# Patient Record
Sex: FEMALE | Race: OTHER | Employment: OTHER | URBAN - METROPOLITAN AREA
[De-identification: names, ages, dates, MRNs, and addresses within clinical notes are randomized per-mention and may not be internally consistent; named-entity substitution may affect disease eponyms.]

---

## 2017-04-30 PROBLEM — N39.490 OVERFLOW INCONTINENCE OF URINE: Status: ACTIVE | Noted: 2017-04-30

## 2017-04-30 PROBLEM — E11.3599: Status: ACTIVE | Noted: 2017-04-30

## 2017-04-30 PROBLEM — I72.0 CAROTID ANEURYSM, RIGHT (HCC): Status: ACTIVE | Noted: 2017-04-30

## 2017-04-30 PROBLEM — I25.10 CORONARY ARTERY DISEASE INVOLVING NATIVE CORONARY ARTERY OF NATIVE HEART WITHOUT ANGINA PECTORIS: Status: ACTIVE | Noted: 2017-04-30

## 2017-05-11 PROBLEM — M17.12 OSTEOARTHROSIS, LOCALIZED, PRIMARY, KNEE, LEFT: Status: ACTIVE | Noted: 2017-05-11

## 2017-05-11 PROBLEM — M17.11 PRIMARY LOCALIZED OSTEOARTHROSIS, LOWER LEG, RIGHT: Status: ACTIVE | Noted: 2017-05-11

## 2017-06-06 PROCEDURE — 87077 CULTURE AEROBIC IDENTIFY: CPT | Performed by: UROLOGY

## 2017-06-06 PROCEDURE — 87186 SC STD MICRODIL/AGAR DIL: CPT | Performed by: UROLOGY

## 2017-06-06 PROCEDURE — 87086 URINE CULTURE/COLONY COUNT: CPT | Performed by: UROLOGY

## 2017-06-07 PROBLEM — N39.46 MIXED URGE AND STRESS INCONTINENCE: Status: ACTIVE | Noted: 2017-06-07

## 2017-08-04 ENCOUNTER — HOSPITAL ENCOUNTER (INPATIENT)
Facility: HOSPITAL | Age: 62
LOS: 3 days | Discharge: HOME | DRG: 101 | End: 2017-08-07
Attending: EMERGENCY MEDICINE | Admitting: HOSPITALIST
Payer: MEDICARE

## 2017-08-04 ENCOUNTER — APPOINTMENT (OUTPATIENT)
Dept: GENERAL RADIOLOGY | Facility: HOSPITAL | Age: 62
DRG: 101 | End: 2017-08-04
Attending: EMERGENCY MEDICINE
Payer: MEDICARE

## 2017-08-04 ENCOUNTER — APPOINTMENT (OUTPATIENT)
Dept: CT IMAGING | Facility: HOSPITAL | Age: 62
DRG: 101 | End: 2017-08-04
Attending: EMERGENCY MEDICINE
Payer: MEDICARE

## 2017-08-04 DIAGNOSIS — R55 SYNCOPE, UNSPECIFIED SYNCOPE TYPE: Primary | ICD-10-CM

## 2017-08-04 LAB
ALBUMIN SERPL-MCNC: 4 G/DL (ref 3.5–4.8)
ALP LIVER SERPL-CCNC: 139 U/L (ref 50–130)
ALT SERPL-CCNC: 20 U/L (ref 14–54)
AST SERPL-CCNC: 19 U/L (ref 15–41)
BASOPHILS # BLD AUTO: 0.02 X10(3) UL (ref 0–0.1)
BASOPHILS NFR BLD AUTO: 0.5 %
BILIRUB SERPL-MCNC: 0.6 MG/DL (ref 0.1–2)
BILIRUB UR QL STRIP.AUTO: NEGATIVE
BUN BLD-MCNC: 15 MG/DL (ref 8–20)
CALCIUM BLD-MCNC: 9.7 MG/DL (ref 8.3–10.3)
CHLORIDE: 105 MMOL/L (ref 101–111)
CLARITY UR REFRACT.AUTO: CLEAR
CO2: 27 MMOL/L (ref 22–32)
CREAT BLD-MCNC: 1.17 MG/DL (ref 0.55–1.02)
EOSINOPHIL # BLD AUTO: 0.03 X10(3) UL (ref 0–0.3)
EOSINOPHIL NFR BLD AUTO: 0.7 %
ERYTHROCYTE [DISTWIDTH] IN BLOOD BY AUTOMATED COUNT: 13.8 % (ref 11.5–16)
GLUCOSE BLD-MCNC: 123 MG/DL (ref 65–99)
GLUCOSE BLD-MCNC: 134 MG/DL (ref 70–99)
GLUCOSE BLD-MCNC: 167 MG/DL (ref 65–99)
GLUCOSE BLD-MCNC: 282 MG/DL (ref 65–99)
GLUCOSE UR STRIP.AUTO-MCNC: NEGATIVE MG/DL
HCT VFR BLD AUTO: 43.3 % (ref 34–50)
HGB BLD-MCNC: 13.9 G/DL (ref 12–16)
IMMATURE GRANULOCYTE COUNT: 0.01 X10(3) UL (ref 0–1)
IMMATURE GRANULOCYTE RATIO %: 0.2 %
KETONES UR STRIP.AUTO-MCNC: NEGATIVE MG/DL
LEUKOCYTE ESTERASE UR QL STRIP.AUTO: NEGATIVE
LYMPHOCYTES # BLD AUTO: 1.72 X10(3) UL (ref 0.9–4)
LYMPHOCYTES NFR BLD AUTO: 39.8 %
M PROTEIN MFR SERPL ELPH: 8.1 G/DL (ref 6.1–8.3)
MCH RBC QN AUTO: 28 PG (ref 27–33.2)
MCHC RBC AUTO-ENTMCNC: 32.1 G/DL (ref 31–37)
MCV RBC AUTO: 87.3 FL (ref 81–100)
MONOCYTES # BLD AUTO: 0.43 X10(3) UL (ref 0.1–0.6)
MONOCYTES NFR BLD AUTO: 10 %
NEUTROPHIL ABS PRELIM: 2.11 X10 (3) UL (ref 1.3–6.7)
NEUTROPHILS # BLD AUTO: 2.11 X10(3) UL (ref 1.3–6.7)
NEUTROPHILS NFR BLD AUTO: 48.8 %
NITRITE UR QL STRIP.AUTO: NEGATIVE
PH UR STRIP.AUTO: 6 [PH] (ref 4.5–8)
PLATELET # BLD AUTO: 283 10(3)UL (ref 150–450)
POTASSIUM SERPL-SCNC: 3.9 MMOL/L (ref 3.6–5.1)
PROT UR STRIP.AUTO-MCNC: NEGATIVE MG/DL
RBC # BLD AUTO: 4.96 X10(6)UL (ref 3.8–5.1)
RBC UR QL AUTO: NEGATIVE
RED CELL DISTRIBUTION WIDTH-SD: 44 FL (ref 35.1–46.3)
SODIUM SERPL-SCNC: 141 MMOL/L (ref 136–144)
SP GR UR STRIP.AUTO: <1.005 (ref 1–1.03)
UROBILINOGEN UR STRIP.AUTO-MCNC: <2 MG/DL
WBC # BLD AUTO: 4.3 X10(3) UL (ref 4–13)

## 2017-08-04 PROCEDURE — 99285 EMERGENCY DEPT VISIT HI MDM: CPT

## 2017-08-04 PROCEDURE — 71010 XR CHEST AP PORTABLE  (CPT=71010): CPT | Performed by: EMERGENCY MEDICINE

## 2017-08-04 PROCEDURE — 85025 COMPLETE CBC W/AUTO DIFF WBC: CPT | Performed by: EMERGENCY MEDICINE

## 2017-08-04 PROCEDURE — 70450 CT HEAD/BRAIN W/O DYE: CPT | Performed by: EMERGENCY MEDICINE

## 2017-08-04 PROCEDURE — 93005 ELECTROCARDIOGRAM TRACING: CPT

## 2017-08-04 PROCEDURE — 82962 GLUCOSE BLOOD TEST: CPT

## 2017-08-04 PROCEDURE — 83036 HEMOGLOBIN GLYCOSYLATED A1C: CPT | Performed by: HOSPITALIST

## 2017-08-04 PROCEDURE — 80053 COMPREHEN METABOLIC PANEL: CPT | Performed by: EMERGENCY MEDICINE

## 2017-08-04 PROCEDURE — 96360 HYDRATION IV INFUSION INIT: CPT

## 2017-08-04 PROCEDURE — 93010 ELECTROCARDIOGRAM REPORT: CPT

## 2017-08-04 PROCEDURE — 96361 HYDRATE IV INFUSION ADD-ON: CPT

## 2017-08-04 PROCEDURE — 81003 URINALYSIS AUTO W/O SCOPE: CPT | Performed by: EMERGENCY MEDICINE

## 2017-08-04 RX ORDER — FLUOXETINE HYDROCHLORIDE 20 MG/1
20 CAPSULE ORAL DAILY
Status: DISCONTINUED | OUTPATIENT
Start: 2017-08-05 | End: 2017-08-04

## 2017-08-04 RX ORDER — ATORVASTATIN CALCIUM 20 MG/1
20 TABLET, FILM COATED ORAL NIGHTLY
Status: DISCONTINUED | OUTPATIENT
Start: 2017-08-04 | End: 2017-08-07

## 2017-08-04 RX ORDER — FLUOXETINE HYDROCHLORIDE 20 MG/1
20 CAPSULE ORAL DAILY
Status: DISCONTINUED | OUTPATIENT
Start: 2017-08-04 | End: 2017-08-07

## 2017-08-04 RX ORDER — ASPIRIN 81 MG/1
81 TABLET, CHEWABLE ORAL DAILY
Status: DISCONTINUED | OUTPATIENT
Start: 2017-08-04 | End: 2017-08-07

## 2017-08-04 RX ORDER — HYDROCHLOROTHIAZIDE 25 MG/1
25 TABLET ORAL DAILY
Status: DISCONTINUED | OUTPATIENT
Start: 2017-08-05 | End: 2017-08-04

## 2017-08-04 RX ORDER — HYDROCHLOROTHIAZIDE 25 MG/1
25 TABLET ORAL DAILY
COMMUNITY
End: 2018-05-18

## 2017-08-04 RX ORDER — FUROSEMIDE 20 MG/1
40 TABLET ORAL DAILY
COMMUNITY
End: 2017-10-25

## 2017-08-04 RX ORDER — LOSARTAN POTASSIUM 100 MG/1
100 TABLET ORAL DAILY
Status: DISCONTINUED | OUTPATIENT
Start: 2017-08-04 | End: 2017-08-04

## 2017-08-04 RX ORDER — METOPROLOL SUCCINATE 100 MG/1
100 TABLET, EXTENDED RELEASE ORAL DAILY
Status: ON HOLD | COMMUNITY
End: 2017-08-06

## 2017-08-04 RX ORDER — HYDROCHLOROTHIAZIDE 25 MG/1
25 TABLET ORAL DAILY
Status: DISCONTINUED | OUTPATIENT
Start: 2017-08-04 | End: 2017-08-07

## 2017-08-04 RX ORDER — MELATONIN
325
Status: DISCONTINUED | OUTPATIENT
Start: 2017-08-05 | End: 2017-08-07

## 2017-08-04 RX ORDER — POTASSIUM CHLORIDE 20 MEQ/1
40 TABLET, EXTENDED RELEASE ORAL DAILY
COMMUNITY
End: 2018-05-18

## 2017-08-04 RX ORDER — AMLODIPINE BESYLATE 5 MG/1
10 TABLET ORAL DAILY
Status: DISCONTINUED | OUTPATIENT
Start: 2017-08-04 | End: 2017-08-07

## 2017-08-04 RX ORDER — OXYBUTYNIN CHLORIDE 10 MG/1
10 TABLET, EXTENDED RELEASE ORAL DAILY
Status: DISCONTINUED | OUTPATIENT
Start: 2017-08-05 | End: 2017-08-04

## 2017-08-04 RX ORDER — IRBESARTAN 300 MG/1
300 TABLET ORAL NIGHTLY
COMMUNITY
End: 2017-11-06

## 2017-08-04 RX ORDER — OXYBUTYNIN CHLORIDE 10 MG/1
10 TABLET, EXTENDED RELEASE ORAL DAILY
Status: DISCONTINUED | OUTPATIENT
Start: 2017-08-04 | End: 2017-08-07

## 2017-08-04 RX ORDER — AMLODIPINE BESYLATE 10 MG/1
10 TABLET ORAL DAILY
COMMUNITY
End: 2017-10-25

## 2017-08-04 RX ORDER — FLUOXETINE HYDROCHLORIDE 20 MG/1
20 CAPSULE ORAL DAILY
COMMUNITY
End: 2017-10-25

## 2017-08-04 RX ORDER — SODIUM CHLORIDE 9 MG/ML
INJECTION, SOLUTION INTRAVENOUS CONTINUOUS
Status: DISCONTINUED | OUTPATIENT
Start: 2017-08-04 | End: 2017-08-05

## 2017-08-04 RX ORDER — AMLODIPINE BESYLATE 5 MG/1
10 TABLET ORAL DAILY
Status: DISCONTINUED | OUTPATIENT
Start: 2017-08-04 | End: 2017-08-04

## 2017-08-04 RX ORDER — MV,CALCIUM,MIN/IRON/FOLIC/VITK 9-200-40
1 TABLET,CHEWABLE ORAL 4 TIMES DAILY
COMMUNITY

## 2017-08-04 RX ORDER — FLUOXETINE HYDROCHLORIDE 20 MG/1
20 CAPSULE ORAL DAILY
Status: DISCONTINUED | OUTPATIENT
Start: 2017-08-04 | End: 2017-08-04

## 2017-08-04 RX ORDER — POTASSIUM CHLORIDE 1.5 G/1.77G
40 POWDER, FOR SOLUTION ORAL DAILY
COMMUNITY
End: 2017-08-04

## 2017-08-04 RX ORDER — MELOXICAM 15 MG/1
15 TABLET ORAL DAILY
COMMUNITY
End: 2017-10-25

## 2017-08-04 RX ORDER — ATORVASTATIN CALCIUM 20 MG/1
20 TABLET, FILM COATED ORAL NIGHTLY
Status: DISCONTINUED | OUTPATIENT
Start: 2017-08-04 | End: 2017-08-04

## 2017-08-04 RX ORDER — ATORVASTATIN CALCIUM 20 MG/1
20 TABLET, FILM COATED ORAL NIGHTLY
COMMUNITY
End: 2018-03-15

## 2017-08-04 RX ORDER — HEPARIN SODIUM 5000 [USP'U]/ML
5000 INJECTION, SOLUTION INTRAVENOUS; SUBCUTANEOUS EVERY 8 HOURS SCHEDULED
Status: DISCONTINUED | OUTPATIENT
Start: 2017-08-04 | End: 2017-08-05

## 2017-08-04 RX ORDER — HYDROCHLOROTHIAZIDE 25 MG/1
25 TABLET ORAL
Status: DISCONTINUED | OUTPATIENT
Start: 2017-08-05 | End: 2017-08-04

## 2017-08-04 RX ORDER — MELATONIN
325
COMMUNITY
End: 2018-01-17

## 2017-08-04 RX ORDER — LOSARTAN POTASSIUM 100 MG/1
100 TABLET ORAL DAILY
Status: DISCONTINUED | OUTPATIENT
Start: 2017-08-04 | End: 2017-08-07

## 2017-08-04 RX ORDER — DEXTROSE MONOHYDRATE 25 G/50ML
50 INJECTION, SOLUTION INTRAVENOUS
Status: DISCONTINUED | OUTPATIENT
Start: 2017-08-04 | End: 2017-08-07

## 2017-08-04 NOTE — ED PROVIDER NOTES
Patient Seen in: [facility] Emergency Department    History   Patient presents with:  Syncope (cardiovascular, neurologic)    Stated Complaint: SYNCOPE    HPI    61-year-old female presents to the emergency department from the [address] blood pressure    • High cholesterol    • Lumbago     tizanidine daily for years--2 pills daily   • Lumbar disc disease     [facility]--back injections   • Migraine     as per neurology-Dr. Ayon--well-controlled on Topomax   • Muscle weakness MOUTH IN THE MORNING WITH BREAKFAST   Pioglitazone HCl 15 MG Oral Tab,  Take 1 tablet (15 mg total) by mouth daily.    Glucose Blood (ONETOUCH ULTRA BLUE) In Vitro Strip,  Test blood sugar 4 times per day Dx S20.1656, non-insulin dependent   ONETOUCH LANCET above.    PSFH elements reviewed from today and agreed except as otherwise stated in HPI.     Physical Exam   ED Triage Vitals [08/04/17 1705]  BP: 150/77  Pulse: 52  Resp: 16  Temp: 97.6 °F (36.4 °C)  Temp src: Temporal  SpO2: 96 %  O2 Device: None (Room a RAINBOW DRAW GOLD   RAINBOW DRAW LAVENDER   RAINBOW DRAW LIGHT GREEN     EKG    Rate, intervals and axes as noted on EKG Report. Rate: 53  Rhythm: Sinus Rhythm  Reading: Right bundle branch block. Abnormal EKG. I agree with EKG report.          Chest x

## 2017-08-05 ENCOUNTER — APPOINTMENT (OUTPATIENT)
Dept: CV DIAGNOSTICS | Facility: HOSPITAL | Age: 62
DRG: 101 | End: 2017-08-05
Attending: INTERNAL MEDICINE
Payer: MEDICARE

## 2017-08-05 LAB
ATRIAL RATE: 53 BPM
BASOPHILS # BLD AUTO: 0.03 X10(3) UL (ref 0–0.1)
BASOPHILS NFR BLD AUTO: 0.5 %
BUN BLD-MCNC: 13 MG/DL (ref 8–20)
CALCIUM BLD-MCNC: 8.8 MG/DL (ref 8.3–10.3)
CHLORIDE: 112 MMOL/L (ref 101–111)
CO2: 26 MMOL/L (ref 22–32)
CREAT BLD-MCNC: 0.87 MG/DL (ref 0.55–1.02)
EOSINOPHIL # BLD AUTO: 0.06 X10(3) UL (ref 0–0.3)
EOSINOPHIL NFR BLD AUTO: 1.1 %
ERYTHROCYTE [DISTWIDTH] IN BLOOD BY AUTOMATED COUNT: 13.9 % (ref 11.5–16)
EST. AVERAGE GLUCOSE BLD GHB EST-MCNC: 154 MG/DL (ref 68–126)
GLUCOSE BLD-MCNC: 113 MG/DL (ref 65–99)
GLUCOSE BLD-MCNC: 137 MG/DL (ref 65–99)
GLUCOSE BLD-MCNC: 137 MG/DL (ref 65–99)
GLUCOSE BLD-MCNC: 208 MG/DL (ref 65–99)
GLUCOSE BLD-MCNC: 95 MG/DL (ref 70–99)
HBA1C MFR BLD HPLC: 7 % (ref ?–5.7)
HCT VFR BLD AUTO: 32.7 % (ref 34–50)
HGB BLD-MCNC: 10.4 G/DL (ref 12–16)
IMMATURE GRANULOCYTE COUNT: 0.01 X10(3) UL (ref 0–1)
IMMATURE GRANULOCYTE RATIO %: 0.2 %
LYMPHOCYTES # BLD AUTO: 2.1 X10(3) UL (ref 0.9–4)
LYMPHOCYTES NFR BLD AUTO: 38.3 %
MCH RBC QN AUTO: 28.3 PG (ref 27–33.2)
MCHC RBC AUTO-ENTMCNC: 31.8 G/DL (ref 31–37)
MCV RBC AUTO: 88.9 FL (ref 81–100)
MONOCYTES # BLD AUTO: 0.57 X10(3) UL (ref 0.1–0.6)
MONOCYTES NFR BLD AUTO: 10.4 %
NEUTROPHIL ABS PRELIM: 2.71 X10 (3) UL (ref 1.3–6.7)
NEUTROPHILS # BLD AUTO: 2.71 X10(3) UL (ref 1.3–6.7)
NEUTROPHILS NFR BLD AUTO: 49.5 %
P AXIS: 55 DEGREES
P-R INTERVAL: 130 MS
PLATELET # BLD AUTO: 214 10(3)UL (ref 150–450)
POTASSIUM SERPL-SCNC: 3.4 MMOL/L (ref 3.6–5.1)
Q-T INTERVAL: 472 MS
QRS DURATION: 138 MS
QTC CALCULATION (BEZET): 442 MS
R AXIS: 72 DEGREES
RBC # BLD AUTO: 3.68 X10(6)UL (ref 3.8–5.1)
RED CELL DISTRIBUTION WIDTH-SD: 45 FL (ref 35.1–46.3)
SODIUM SERPL-SCNC: 145 MMOL/L (ref 136–144)
T AXIS: 7 DEGREES
TROPONIN: <0.046 NG/ML (ref ?–0.05)
VENTRICULAR RATE: 53 BPM
WBC # BLD AUTO: 5.5 X10(3) UL (ref 4–13)

## 2017-08-05 PROCEDURE — 85025 COMPLETE CBC W/AUTO DIFF WBC: CPT | Performed by: HOSPITALIST

## 2017-08-05 PROCEDURE — 80048 BASIC METABOLIC PNL TOTAL CA: CPT | Performed by: HOSPITALIST

## 2017-08-05 PROCEDURE — 95819 EEG AWAKE AND ASLEEP: CPT

## 2017-08-05 PROCEDURE — 84484 ASSAY OF TROPONIN QUANT: CPT | Performed by: INTERNAL MEDICINE

## 2017-08-05 PROCEDURE — 95816 EEG AWAKE AND DROWSY: CPT

## 2017-08-05 PROCEDURE — 82962 GLUCOSE BLOOD TEST: CPT

## 2017-08-05 RX ORDER — POTASSIUM CHLORIDE 20 MEQ/1
40 TABLET, EXTENDED RELEASE ORAL EVERY 4 HOURS
Status: COMPLETED | OUTPATIENT
Start: 2017-08-05 | End: 2017-08-05

## 2017-08-05 RX ORDER — HEPARIN SODIUM 5000 [USP'U]/ML
7500 INJECTION, SOLUTION INTRAVENOUS; SUBCUTANEOUS EVERY 8 HOURS SCHEDULED
Status: DISCONTINUED | OUTPATIENT
Start: 2017-08-05 | End: 2017-08-07

## 2017-08-05 RX ORDER — CALCIUM CARBONATE 200(500)MG
500 TABLET,CHEWABLE ORAL 3 TIMES DAILY PRN
Status: DISCONTINUED | OUTPATIENT
Start: 2017-08-05 | End: 2017-08-07

## 2017-08-05 NOTE — H&P
[facility] Hospitalist History and Physical      CC: Syncope x 2    PCP: Maggie Hsieh MD    History of Present Illness: Patient is a 65year old female with PMH sig for DM, carotid aneursym,  DM, HTN, migraines, MVP and hx of TIA presenting with 2 episodes of sy • High blood pressure    • High cholesterol    • Lumbago     tizanidine daily for years--2 pills daily   • Lumbar disc disease     [facility]--back injections   • Migraine     as per neurology-Dr. Ayon--well-controlled on Topomax   • Muscle we LANCETS Does not apply Misc Test blood sugar 4 times per day Dx: I77.1300, non-insulin dependent Disp: 300 each Rfl: 3   IRBESARTAN 300 MG Oral Tab TAKE 1 TABLET BY MOUTH EVERY DAY Disp: 30 tablet Rfl: 2   METFORMIN HCL 1000 MG Oral Tab TAKE 1 TABLET BY MO Gen: No acute distress  Eyes: Pink conjunctivae, No ptosis, PERRL  Neck: No masses, trachae midline. No thyromegaly  Pulm: Lungs clear bilaterally, normal respiratory effort  CV: Heart with regular rate and rhythm, no murmur. Normal PMI.     GI: Abdome currently she has no focal neurologic symptoms or defects   - Check orthostatics  - BS has been OK, check HA1C- autonomic dysfunction?     HTN  - Continue home meds with exception of BB and lasix    DM  - Hold home meds, SSI     Reported hx of TIA  - Contin

## 2017-08-05 NOTE — CONSULTS
[address]    PATIENT'S NAME: Omari Ahn   ATTENDING PHYSICIAN: [address] [facility] Lita Waldron MD   CONSULTING PHYSICIAN: Francisco Correia M.D.    PATIENT ACCOUNT#:   [de-identified]    LOCATION:  [address] [place] [address]  MEDICAL RECORD #:   [id number]       DATE irbesartan; metformin; oxybutynin; metoprolol; hydrochlorothiazide; Januvia; and 81 mg of aspirin a day. ALLERGIES:  Allergic or intolerant to erythromycin, penicillin, sulfa, Norco.    REVIEW OF SYSTEMS:  The rest of her review of systems is negative.

## 2017-08-05 NOTE — PROGRESS NOTES
08/04/17 2215 08/04/17 2217 08/04/17 2220   Vital Signs   /53 127/65 124/68   BP Location Left arm Left arm Left arm   BP Method Automatic Automatic Automatic   Patient Position Lying Sitting Standing       Orthostatic BP negative

## 2017-08-05 NOTE — PROGRESS NOTES
[facility] Progress Note:  Anticoagulation Weight Dose Adjustment for heparin    Vianne Sensor is a 65year old female who has been prescribed heparin for VTE prophylaxis. Estimated Creatinine Clearance: 63.6 mL/min (based on SCr of 0.87 mg/dL).

## 2017-08-05 NOTE — CONSULTS
[facility] Cardiology Consultation Dipak Auguste MD    The patient was interviewed, examined, the chart was reviewed and the consult was dictated. This is a 65year old female with a chief complaint of syncope ×2. Impression:  1.   Syncope ×2 with urinary in

## 2017-08-05 NOTE — PROGRESS NOTES
AOx4, RA, NSR/SB when sleeping  Up with standby and walker to bathroom  Patient reports no nausea or dizziness  Orthostatics negative  Contacted neurology for consult - no new orders, no neuro checks - will see in the morning  SCDs in place, waiting for la

## 2017-08-05 NOTE — PROGRESS NOTES
[facility] Hospitalist Progress Note                                                                   [address] [facility]  3/8/1955    CC: Syncope    Interval History:  - No syncope or dizziness since change to ROS from my documentation yesterday, except as otherwise noted in the Interval History above.     Assessment/Plan:  Patient is a 65year old female with PMH sig for DM, carotid aneursym, carotid disease, DM, HTN, migraines, MVP and hx of TIA prese

## 2017-08-05 NOTE — CONSULTS
[address]    PATIENT'S NAME: Arleen Bustos   ATTENDING PHYSICIAN: [facility] Darline Olivarez MD   CONSULTING PHYSICIAN: Rell Ramirez M.D.    PATIENT ACCOUNT#:   [de-identified]    LOCATION:  [address] [place] [address]  MEDICAL RECORD #:   [id number]       DATE OF The patient initially felt okay, heart rate was low, blood pressure was normal.  The patient has been checked for orthostatic hypotension and has not been orthostatic. Her beta-blocker was discontinued and her heart rate has normalized.   Patient has histo M.D.  d: 08/05/2017 14:31:13  t: 08/05/2017 15:23:01  [facility] [id number]  [facility]/

## 2017-08-06 ENCOUNTER — APPOINTMENT (OUTPATIENT)
Dept: ULTRASOUND IMAGING | Facility: HOSPITAL | Age: 62
DRG: 101 | End: 2017-08-06
Attending: HOSPITALIST
Payer: MEDICARE

## 2017-08-06 ENCOUNTER — APPOINTMENT (OUTPATIENT)
Dept: CV DIAGNOSTICS | Facility: HOSPITAL | Age: 62
DRG: 101 | End: 2017-08-06
Attending: INTERNAL MEDICINE
Payer: MEDICARE

## 2017-08-06 LAB
GLUCOSE BLD-MCNC: 113 MG/DL (ref 65–99)
GLUCOSE BLD-MCNC: 126 MG/DL (ref 65–99)
GLUCOSE BLD-MCNC: 151 MG/DL (ref 65–99)
GLUCOSE BLD-MCNC: 154 MG/DL (ref 65–99)
PLATELET # BLD AUTO: 209 10(3)UL (ref 150–450)
POTASSIUM SERPL-SCNC: 4 MMOL/L (ref 3.6–5.1)

## 2017-08-06 PROCEDURE — 93306 TTE W/DOPPLER COMPLETE: CPT | Performed by: INTERNAL MEDICINE

## 2017-08-06 PROCEDURE — 93005 ELECTROCARDIOGRAM TRACING: CPT

## 2017-08-06 PROCEDURE — 93010 ELECTROCARDIOGRAM REPORT: CPT | Performed by: INTERNAL MEDICINE

## 2017-08-06 PROCEDURE — 84132 ASSAY OF SERUM POTASSIUM: CPT | Performed by: HOSPITALIST

## 2017-08-06 PROCEDURE — 85049 AUTOMATED PLATELET COUNT: CPT | Performed by: HOSPITALIST

## 2017-08-06 PROCEDURE — 93880 EXTRACRANIAL BILAT STUDY: CPT | Performed by: HOSPITALIST

## 2017-08-06 PROCEDURE — 82962 GLUCOSE BLOOD TEST: CPT

## 2017-08-06 RX ORDER — LEVETIRACETAM 250 MG/1
250 TABLET ORAL 2 TIMES DAILY
Status: DISCONTINUED | OUTPATIENT
Start: 2017-08-06 | End: 2017-08-07

## 2017-08-06 RX ORDER — LEVETIRACETAM 250 MG/1
250 TABLET ORAL 2 TIMES DAILY
Qty: 60 TABLET | Refills: 0 | Status: SHIPPED | OUTPATIENT
Start: 2017-08-06 | End: 2017-09-06

## 2017-08-06 RX ORDER — ASPIRIN 81 MG/1
81 TABLET, CHEWABLE ORAL DAILY
Qty: 30 TABLET | Refills: 0 | Status: SHIPPED | OUTPATIENT
Start: 2017-08-06 | End: 2017-09-05

## 2017-08-06 RX ORDER — KETOROLAC TROMETHAMINE 30 MG/ML
15 INJECTION, SOLUTION INTRAMUSCULAR; INTRAVENOUS EVERY 6 HOURS PRN
Status: DISCONTINUED | OUTPATIENT
Start: 2017-08-06 | End: 2017-08-07

## 2017-08-06 RX ORDER — ACETAMINOPHEN 325 MG/1
650 TABLET ORAL EVERY 6 HOURS PRN
Status: DISCONTINUED | OUTPATIENT
Start: 2017-08-06 | End: 2017-08-07

## 2017-08-06 NOTE — PROCEDURES
[facility]  Kaylah Scott 12  [place], [address] [address]      PATIENT'S NAME: Moncho Martinez   ATTENDING PHYSICIAN: [address] [place] [address]  Eunice Melvin MD   PATIENT ACCOUNT #: [de-identified] LOCATION: [address] [place] [address]   MEDICAL RECORD #: [id number] DATE

## 2017-08-06 NOTE — PLAN OF CARE
Alert and oriented times four  No neurological deficits, no complaints of dizziness/lightheadedness  Up to the bathroom with walker and assist   Tests completed, ECHO pending  Keppra started for suggestive findings on EEG  Patient education completed regar

## 2017-08-06 NOTE — PROGRESS NOTES
[facility] Hospitalist Progress Note                                                                   [address] [facility]  3/8/1955    CC: Syncope    Interval History:  - Feels well, mild HA  - EEG ne 0457   RBC  4.96  3.68*   --    HGB  13.9  10.4*   --    HCT  43.3  32.7*   --    MCV  87.3  88.9   --    MCH  28.0  28.3   --    MCHC  32.1  31.8   --    RDW  13.8  13.9   --    NEPRELIM  2.11  2.71   --    WBC  4.3  5.5   --    PLT  283.0  214.0  209.0 Hospitalist  Pager: [phone]

## 2017-08-06 NOTE — PLAN OF CARE
AOx4, RA  NSR/SB when sleeping  Up with walker and standby  Plan for ultrasound of carotids, echo  Awaiting results of EEG  Patient has no complaints of nausea, dizziness, or pain  VSS, resting comfortably    CARDIOVASCULAR - ADULT    • Maintains optimal c

## 2017-08-06 NOTE — PROGRESS NOTES
[facility] Cardiology Progress Note        Tuyet Drexel Patient Status:  Inpatient    3/8/1955 MRN [id number]   [facility] 7NE-A Attending Brice Pena,*   Hosp Day # 2 PCP Ashanti Dick MD     Subjective and pedal pulses 2+  Neurologic: no focal deficits  Skin: Warm and dry.      Telemetry: SR    EKG:      Echo:      Cardiac Cath:      Labs:  HEM:  Recent Labs   Lab  08/04/17   1708  08/05/17   0519  08/06/17   0457   WBC  4.3  5.5   --    HGB  13.9  10.4* of syncopal attacks. Awaiting neurology's final disposition possible antiseizure medication.   Sandra ALSTON  8/6/2017  11:11 AM

## 2017-08-06 NOTE — PROGRESS NOTES
Tyler Birmingham is a 65year old female. Patient presents with:  Syncope (cardiovascular, neurologic)    HPI:    No events overnight    Pt just completed echo, carotid to be done yet    EEG #:       1. 10-[employer].   2.  Bipolar and feels well otherwise  SKIN: denies any unusual skin lesions or rashes  EYES: no visual complaints or deficits  HEENT: denies nasal congestion, sinus pain or sore throat; hearing loss negative  RESPIRATORY: denies shortness of breath, wheezing or cough   CA

## 2017-08-07 VITALS
OXYGEN SATURATION: 99 % | HEIGHT: 66 IN | DIASTOLIC BLOOD PRESSURE: 79 MMHG | WEIGHT: 272.06 LBS | RESPIRATION RATE: 20 BRPM | HEART RATE: 66 BPM | TEMPERATURE: 98 F | SYSTOLIC BLOOD PRESSURE: 92 MMHG | BODY MASS INDEX: 43.72 KG/M2

## 2017-08-07 LAB
ATRIAL RATE: 54 BPM
GLUCOSE BLD-MCNC: 124 MG/DL (ref 65–99)
GLUCOSE BLD-MCNC: 143 MG/DL (ref 65–99)
P AXIS: 49 DEGREES
P-R INTERVAL: 130 MS
Q-T INTERVAL: 450 MS
QRS DURATION: 134 MS
QTC CALCULATION (BEZET): 426 MS
R AXIS: 52 DEGREES
T AXIS: 0 DEGREES
VENTRICULAR RATE: 54 BPM

## 2017-08-07 PROCEDURE — 82962 GLUCOSE BLOOD TEST: CPT

## 2017-08-07 NOTE — PLAN OF CARE
NURSING DISCHARGE NOTE    Discharged Home via Wheelchair. Accompanied by Support staff  Belongings Taken by patient/family.       Discharge paperwork reviewed with patient including new medications and follow up appointments

## 2017-08-07 NOTE — PLAN OF CARE
Assumed care at 1900. AOx4. C/o slight headache. Pain med offered but pt wanted to wait if it will go away. Ambulates to bathroom without any difficulty. Vitals stable. Possible d/c today. Plan of care discussed with pt. Call light within reach.

## 2017-08-07 NOTE — DISCHARGE SUMMARY
General Medicine Discharge Summary     Patient ID:  Tuyet Huntington  65year old  3/8/1955    Admit date: 8/4/2017    Discharge date and time:  8/7/17    Attending Physician: Joselin Hurt arrhythmias on tele  - ECHO looks OK     HTN  - Continue home meds on DC with exception of BB     DM  - Hold home meds, SSI- return to home regimen on DC  - HA1C is 7     Reported hx of TIA  - Continue ASA daily  - No focal deficits on exam     Hx of urina daily. Oxybutynin Chloride ER 10 MG Oral Tablet 24 Hr  Take 1 tablet (10 mg total) by mouth daily. SITagliptin Phosphate (JANUVIA) 100 MG Oral Tab  Take 1 tablet (100 mg total) by mouth daily.     silver sulfADIAZINE (SILVADENE) 1 % External Cream  Ap

## 2017-08-07 NOTE — PROGRESS NOTES
[facility] Cardiology Progress Note - Sharon Johnston Patient Status:  Inpatient    3/8/1955 MRN [id number]   [facility] 7NE-A Attending Lacho Mata,*   Hosp Day # 3 PCP Safia Schmidt MD     Subjective:   The normal. No murmur, pericardial rub, S3, thrill, heave or extra cardiac sounds. Lungs: Clear without wheezes, rales, rhonchi or dullness. Normal excursions and effort. Abdomen: Soft, non-tender. No organosplenomegally, mass or rebound, BS-present.   Extre Min PRN   Or      Glucose-Vitamin C (DEX-4) 4-0.006 g chewable tab 8 tablet 8 tablet Oral Q15 Min PRN   Insulin Aspart Pen (NOVOLOG) 100 UNIT/ML flexpen 1-5 Units 1-5 Units Subcutaneous TID CC and HS   AmLODIPine Besylate (NORVASC) tab 10 mg 10 mg Oral Virl Dom

## 2017-08-15 PROBLEM — G40.909 SEIZURE DISORDER (HCC): Status: ACTIVE | Noted: 2017-08-15

## 2017-10-12 PROBLEM — M17.11 PRIMARY LOCALIZED OSTEOARTHROSIS, LOWER LEG, RIGHT: Status: RESOLVED | Noted: 2017-05-11 | Resolved: 2017-10-12

## 2017-10-12 PROBLEM — M17.12 OSTEOARTHROSIS, LOCALIZED, PRIMARY, KNEE, LEFT: Status: RESOLVED | Noted: 2017-05-11 | Resolved: 2017-10-12

## 2017-10-12 PROBLEM — N39.490 OVERFLOW INCONTINENCE OF URINE: Status: RESOLVED | Noted: 2017-04-30 | Resolved: 2017-10-12

## 2018-01-17 PROCEDURE — 36415 COLL VENOUS BLD VENIPUNCTURE: CPT | Performed by: INTERNAL MEDICINE

## 2018-01-17 PROCEDURE — 82570 ASSAY OF URINE CREATININE: CPT | Performed by: INTERNAL MEDICINE

## 2018-01-17 PROCEDURE — 82043 UR ALBUMIN QUANTITATIVE: CPT | Performed by: INTERNAL MEDICINE

## 2018-05-16 PROBLEM — E11.3592: Status: ACTIVE | Noted: 2018-05-16

## 2018-07-17 PROBLEM — M17.0 PRIMARY OSTEOARTHRITIS OF BOTH KNEES: Status: ACTIVE | Noted: 2018-07-17

## 2018-09-04 PROCEDURE — 80074 ACUTE HEPATITIS PANEL: CPT | Performed by: INTERNAL MEDICINE

## 2018-09-04 PROCEDURE — 84075 ASSAY ALKALINE PHOSPHATASE: CPT | Performed by: INTERNAL MEDICINE

## 2018-09-04 PROCEDURE — 83516 IMMUNOASSAY NONANTIBODY: CPT | Performed by: INTERNAL MEDICINE

## 2018-09-04 PROCEDURE — 84080 ASSAY ALKALINE PHOSPHATASES: CPT | Performed by: INTERNAL MEDICINE

## 2018-09-04 PROCEDURE — 36415 COLL VENOUS BLD VENIPUNCTURE: CPT | Performed by: INTERNAL MEDICINE

## 2018-09-04 PROCEDURE — 82390 ASSAY OF CERULOPLASMIN: CPT | Performed by: INTERNAL MEDICINE

## 2018-11-02 ENCOUNTER — APPOINTMENT (OUTPATIENT)
Dept: GENERAL RADIOLOGY | Facility: HOSPITAL | Age: 63
End: 2018-11-02
Attending: EMERGENCY MEDICINE
Payer: MEDICARE

## 2018-11-02 ENCOUNTER — HOSPITAL ENCOUNTER (OUTPATIENT)
Facility: HOSPITAL | Age: 63
Setting detail: OBSERVATION
Discharge: HOME | End: 2018-11-03
Attending: EMERGENCY MEDICINE | Admitting: HOSPITALIST
Payer: MEDICARE

## 2018-11-02 DIAGNOSIS — R07.9 ACUTE CHEST PAIN: Primary | ICD-10-CM

## 2018-11-02 DIAGNOSIS — L02.31 CUTANEOUS ABSCESS OF BUTTOCK: ICD-10-CM

## 2018-11-02 PROCEDURE — 93010 ELECTROCARDIOGRAM REPORT: CPT

## 2018-11-02 PROCEDURE — 85730 THROMBOPLASTIN TIME PARTIAL: CPT | Performed by: EMERGENCY MEDICINE

## 2018-11-02 PROCEDURE — 93005 ELECTROCARDIOGRAM TRACING: CPT

## 2018-11-02 PROCEDURE — 99285 EMERGENCY DEPT VISIT HI MDM: CPT

## 2018-11-02 PROCEDURE — 85610 PROTHROMBIN TIME: CPT | Performed by: EMERGENCY MEDICINE

## 2018-11-02 PROCEDURE — 80061 LIPID PANEL: CPT | Performed by: EMERGENCY MEDICINE

## 2018-11-02 PROCEDURE — 71045 X-RAY EXAM CHEST 1 VIEW: CPT | Performed by: EMERGENCY MEDICINE

## 2018-11-02 PROCEDURE — 84484 ASSAY OF TROPONIN QUANT: CPT | Performed by: EMERGENCY MEDICINE

## 2018-11-02 PROCEDURE — 80053 COMPREHEN METABOLIC PANEL: CPT | Performed by: EMERGENCY MEDICINE

## 2018-11-02 PROCEDURE — 36415 COLL VENOUS BLD VENIPUNCTURE: CPT

## 2018-11-02 RX ORDER — AMLODIPINE BESYLATE 5 MG/1
10 TABLET ORAL
Status: DISCONTINUED | OUTPATIENT
Start: 2018-11-03 | End: 2018-11-03

## 2018-11-02 RX ORDER — ASPIRIN 81 MG/1
81 TABLET, CHEWABLE ORAL DAILY
Status: DISCONTINUED | OUTPATIENT
Start: 2018-11-03 | End: 2018-11-03

## 2018-11-02 RX ORDER — POLYETHYLENE GLYCOL 3350 17 G/17G
17 POWDER, FOR SOLUTION ORAL DAILY PRN
Status: DISCONTINUED | OUTPATIENT
Start: 2018-11-02 | End: 2018-11-03

## 2018-11-02 RX ORDER — DEXTROSE MONOHYDRATE 25 G/50ML
50 INJECTION, SOLUTION INTRAVENOUS
Status: DISCONTINUED | OUTPATIENT
Start: 2018-11-02 | End: 2018-11-03

## 2018-11-02 RX ORDER — OXYBUTYNIN CHLORIDE 10 MG/1
10 TABLET, EXTENDED RELEASE ORAL DAILY
Status: DISCONTINUED | OUTPATIENT
Start: 2018-11-03 | End: 2018-11-03

## 2018-11-02 RX ORDER — ONDANSETRON 2 MG/ML
4 INJECTION INTRAMUSCULAR; INTRAVENOUS EVERY 6 HOURS PRN
Status: DISCONTINUED | OUTPATIENT
Start: 2018-11-02 | End: 2018-11-03

## 2018-11-02 RX ORDER — SODIUM PHOSPHATE, DIBASIC AND SODIUM PHOSPHATE, MONOBASIC 7; 19 G/133ML; G/133ML
1 ENEMA RECTAL ONCE AS NEEDED
Status: DISCONTINUED | OUTPATIENT
Start: 2018-11-02 | End: 2018-11-03

## 2018-11-02 RX ORDER — FUROSEMIDE 40 MG/1
40 TABLET ORAL DAILY
Status: DISCONTINUED | OUTPATIENT
Start: 2018-11-03 | End: 2018-11-03

## 2018-11-02 RX ORDER — HYDROCHLOROTHIAZIDE 25 MG/1
25 TABLET ORAL
Status: DISCONTINUED | OUTPATIENT
Start: 2018-11-03 | End: 2018-11-03

## 2018-11-02 RX ORDER — METOCLOPRAMIDE HYDROCHLORIDE 5 MG/ML
10 INJECTION INTRAMUSCULAR; INTRAVENOUS EVERY 8 HOURS PRN
Status: DISCONTINUED | OUTPATIENT
Start: 2018-11-02 | End: 2018-11-03

## 2018-11-02 RX ORDER — FLUOXETINE HYDROCHLORIDE 20 MG/1
20 CAPSULE ORAL DAILY
Status: DISCONTINUED | OUTPATIENT
Start: 2018-11-03 | End: 2018-11-03

## 2018-11-02 RX ORDER — ONDANSETRON 2 MG/ML
4 INJECTION INTRAMUSCULAR; INTRAVENOUS EVERY 4 HOURS PRN
Status: DISCONTINUED | OUTPATIENT
Start: 2018-11-02 | End: 2018-11-03

## 2018-11-02 RX ORDER — BISACODYL 10 MG
10 SUPPOSITORY, RECTAL RECTAL
Status: DISCONTINUED | OUTPATIENT
Start: 2018-11-02 | End: 2018-11-03

## 2018-11-02 RX ORDER — LEVETIRACETAM 250 MG/1
250 TABLET ORAL 3 TIMES DAILY
Status: DISCONTINUED | OUTPATIENT
Start: 2018-11-03 | End: 2018-11-03

## 2018-11-02 RX ORDER — LOSARTAN POTASSIUM 100 MG/1
100 TABLET ORAL DAILY
Status: DISCONTINUED | OUTPATIENT
Start: 2018-11-03 | End: 2018-11-03

## 2018-11-02 RX ORDER — DOCUSATE SODIUM 100 MG/1
100 CAPSULE, LIQUID FILLED ORAL 2 TIMES DAILY
Status: DISCONTINUED | OUTPATIENT
Start: 2018-11-03 | End: 2018-11-03

## 2018-11-02 RX ORDER — MV,CALCIUM,MIN/IRON/FOLIC/VITK 9-200-40
1 TABLET,CHEWABLE ORAL 4 TIMES DAILY
Status: DISCONTINUED | OUTPATIENT
Start: 2018-11-03 | End: 2018-11-03

## 2018-11-02 RX ORDER — ATORVASTATIN CALCIUM 20 MG/1
20 TABLET, FILM COATED ORAL NIGHTLY
Status: DISCONTINUED | OUTPATIENT
Start: 2018-11-03 | End: 2018-11-03

## 2018-11-02 NOTE — ED INITIAL ASSESSMENT (HPI)
Arrives stating has been sick with vomiting and diarrhea for the past two weeks which stopped on Sunday. In the process, developed a sore to the left inner buttock. Hx of diabetes, has been unable to take meds d/t being ill.   Also reports some chest pain

## 2018-11-03 VITALS
OXYGEN SATURATION: 98 % | HEIGHT: 66.5 IN | RESPIRATION RATE: 18 BRPM | TEMPERATURE: 99 F | DIASTOLIC BLOOD PRESSURE: 64 MMHG | WEIGHT: 258.63 LBS | HEART RATE: 82 BPM | BODY MASS INDEX: 41.07 KG/M2 | SYSTOLIC BLOOD PRESSURE: 112 MMHG

## 2018-11-03 PROCEDURE — 85025 COMPLETE CBC W/AUTO DIFF WBC: CPT | Performed by: HOSPITALIST

## 2018-11-03 PROCEDURE — 83036 HEMOGLOBIN GLYCOSYLATED A1C: CPT | Performed by: HOSPITALIST

## 2018-11-03 PROCEDURE — 84484 ASSAY OF TROPONIN QUANT: CPT | Performed by: HOSPITALIST

## 2018-11-03 PROCEDURE — 82962 GLUCOSE BLOOD TEST: CPT

## 2018-11-03 PROCEDURE — 80048 BASIC METABOLIC PNL TOTAL CA: CPT | Performed by: HOSPITALIST

## 2018-11-03 RX ORDER — CLINDAMYCIN HYDROCHLORIDE 300 MG/1
300 CAPSULE ORAL EVERY 8 HOURS SCHEDULED
Qty: 21 CAPSULE | Refills: 0 | Status: SHIPPED | OUTPATIENT
Start: 2018-11-03 | End: 2018-11-10

## 2018-11-03 RX ORDER — GARLIC EXTRACT 500 MG
1 CAPSULE ORAL DAILY
Status: DISCONTINUED | OUTPATIENT
Start: 2018-11-03 | End: 2018-11-03

## 2018-11-03 RX ORDER — GARLIC EXTRACT 500 MG
1 CAPSULE ORAL DAILY
Qty: 30 CAPSULE | Refills: 0 | Status: SHIPPED | OUTPATIENT
Start: 2018-11-03 | End: 2019-02-08

## 2018-11-03 RX ORDER — CLINDAMYCIN HYDROCHLORIDE 150 MG/1
300 CAPSULE ORAL EVERY 8 HOURS SCHEDULED
Status: DISCONTINUED | OUTPATIENT
Start: 2018-11-03 | End: 2018-11-03

## 2018-11-03 NOTE — PLAN OF CARE
Discharge instructions reviewed with patient and daughter. Home medications and follow up appointments discussed  Antibiotic   From pharmacy discussed.   Verbalized understanding of information given

## 2018-11-03 NOTE — H&P
[facility] Hospitalist H&P       CC: chest pain    PCP: Emily Read MD    History of Present Illness: Pt is a 60 yo with mmp including but not limited to HTN/HL, DMII with retinopathy, cerebrovascular dz with hx TIA who is admitted for cp.   Of [facility], [facility]/15/2017   • Severe nonproliferative diabetic retinopathy     one eye with nonproliferative   • Stress fracture     Right foot--10/14'   • STROKE     tia x3 - short term memory loss,confusion,r sided weakness   • Type II or unspecified type Disp: 90 tablet Rfl: 1   KLOR-CON M20 20 MEQ Oral Tab CR TAKE 2 TABLETS BY MOUTH EVERY DAY Disp: 186 tablet Rfl: 1   FLUOXETINE HCL 20 MG Oral Cap TAKE 1 CAPSULE (20 MG TOTAL) BY MOUTH ONCE DAILY.  Disp: 90 capsule Rfl: 0   FUROSEMIDE 20 MG Oral Tab TAKE 2 and nose within normal limits, hearing intact       Neck: Supple, symmetrical   Lungs:   Clear to auscultation bilaterally.  Normal effort   Chest wall:   +reproducible tenderness to L upper chest.   Heart:  Regular rate and rhythm, S1, S2 normal,no LE yasmeen issues, monitor    **PPx-scds    If ok with cards, ok to d/c from IM perspective- f/u with PCP in 1 week      Outpatient records or previous hospital records reviewed. Further recommendations pending patient's clinical course.   [facility] hospitalist to [facility]

## 2018-11-03 NOTE — CONSULTS
[facility]    PATIENT'S NAME: Jonasmelissa Orellanaa   ATTENDING PHYSICIAN: Kavita Aden. Yessi Jackson MD   CONSULTING PHYSICIAN: Pramod Ahumada M.D.    PATIENT ACCOUNT#:   [de-identified]    LOCATION:  [address] [place] [address]  MEDICAL RECORD #:   [id number]       DATE OF BIRTH chronic. CARDIAC RISK FACTORS:  She has never smoked. She has hypertension, hyperlipidemia, and diabetes. MEDICATIONS:  On admission, irbesartan, aspirin, K-Isatu, Prozac, Lasix, metformin, atorvastatin, amlodipine, meloxicam, hydrochlorothiazide. evaluation. 2.   Continue atorvastatin for lipids. 3.   Blood pressure is controlled. No change in therapy. 4.   Continue on telemetry.     Dictated By Mamadou Aburto M.D.  d: 11/03/2018 07:56:01  t: 11/03/2018 08:58:53  Job [id number]  MON

## 2018-11-03 NOTE — PLAN OF CARE
Problem: CARDIOVASCULAR - ADULT  Goal: Maintains optimal cardiac output and hemodynamic stability  INTERVENTIONS:  - Monitor vital signs, rhythm, and trends  - Monitor for bleeding, hypotension and signs of decreased cardiac output  - Evaluate effectivenes - ADULT  Goal: Incision(s), wounds(s) or drain site(s) healing without S/S of infection  INTERVENTIONS:  - Assess and document risk factors for pressure ulcer development  - Assess and document skin integrity  - Assess and document dressing/incision, wound

## 2018-11-03 NOTE — ED PROVIDER NOTES
Patient Seen in: [facility] Emergency Department    History   Patient presents with:  Abdomen/Flank Pain (GI/)    Stated Complaint: abd pain    HPI    Patient is a pleasant 45-year-old female, with multiple past medical problems, presenting for ev injections   • Migraine     as per neurology-Dr. Ayon--well-controlled on Topomax   • Muscle weakness     r sided weakness   • MVP (mitral valve prolapse)     MVP   • Pneumonia, organism unspecified(486)    • Seizure disorder ([facility] 75.) 8/15/2017   • Severe no Current:/79   Pulse 68   Temp 97.7 °F (36.5 °C) (Temporal)   Resp 20   Ht 168.9 cm (5' 6.5\")   Wt 122.5 kg   SpO2 99%   BMI 42.93 kg/m²         Physical Exam    Vital signs noted.    GENERAL: Patient is awake and alert, resting comfortably on t bundle branch block. Nonspecific ST segment changes. Sinus rhythm at 75 bpm.     Xr Chest Ap Portable  (cpt=71045)    Result Date: 11/2/2018  PROCEDURE:  XR CHEST AP PORTABLE  (CPT=71045)  TECHNIQUE:  AP chest radiograph was obtained.   COMPARISON:  STACY the patient's emergency room stay was without incident.       Admission disposition: 11/2/2018  9:12 PM            Disposition and Plan     Clinical Impression:  Acute chest pain  (primary encounter diagnosis)  Cutaneous abscess of buttock    Disposition:

## 2018-11-03 NOTE — PLAN OF CARE
Problem: CARDIOVASCULAR - ADULT  Goal: Maintains optimal cardiac output and hemodynamic stability  INTERVENTIONS:  - Monitor vital signs, rhythm, and trends  - Monitor for bleeding, hypotension and signs of decreased cardiac output  - Evaluate effectivenes ordered  - Monitor response to electrolyte replacements, including rhythm and repeat lab results as appropriate  - Fluid restriction as ordered  - Instruct patient on fluid and nutrition restrictions as appropriate  Outcome: Progressing  stable    Problem:

## 2019-02-11 PROBLEM — R07.9 ACUTE CHEST PAIN: Status: RESOLVED | Noted: 2018-11-02 | Resolved: 2019-02-11

## 2019-02-11 PROBLEM — R55 SYNCOPE: Status: RESOLVED | Noted: 2017-08-04 | Resolved: 2019-02-11

## 2019-02-11 PROBLEM — L02.31 CUTANEOUS ABSCESS OF BUTTOCK: Status: RESOLVED | Noted: 2018-11-02 | Resolved: 2019-02-11

## 2019-02-11 PROCEDURE — 82570 ASSAY OF URINE CREATININE: CPT | Performed by: INTERNAL MEDICINE

## 2019-02-11 PROCEDURE — 82043 UR ALBUMIN QUANTITATIVE: CPT | Performed by: INTERNAL MEDICINE

## 2021-08-18 PROBLEM — E66.01 MORBID OBESITY WITH BMI OF 40.0-44.9, ADULT (HCC): Status: ACTIVE | Noted: 2021-08-18

## 2021-09-23 ENCOUNTER — OFFICE VISIT (OUTPATIENT)
Dept: SURGERY | Facility: CLINIC | Age: 66
End: 2021-09-23
Payer: MEDICARE

## 2021-09-23 VITALS
SYSTOLIC BLOOD PRESSURE: 114 MMHG | BODY MASS INDEX: 39.49 KG/M2 | DIASTOLIC BLOOD PRESSURE: 68 MMHG | WEIGHT: 237 LBS | HEIGHT: 65 IN

## 2021-09-23 DIAGNOSIS — I67.1 ANEURYSM OF CAVERNOUS PORTION OF RIGHT INTERNAL CAROTID ARTERY: Primary | ICD-10-CM

## 2021-09-23 PROCEDURE — 99204 OFFICE O/P NEW MOD 45 MIN: CPT

## 2021-09-23 NOTE — PROGRESS NOTES
Patient is here for consult referred by Dr Katie Mary. She has hx of carotid aneurysm. She states she had a CT scan and there was a \"blockage\". She fell on 8-8 and does not recall what happened.      Barthel-100  MRS-1    Review of Systems:    Angie Renner

## 2021-09-23 NOTE — PROGRESS NOTES
[facility]  Interventional Neuroradiology Clinic Note      Dalia Perdomo MD  Primary Care    Tamy Wheatley MD  Neurology      Date of Service: 9/23/2021     Dear Itzel Condon,     We had the pleasure of seeing Amy Muñoz in the Interventional MS--tried Lumbar puncture, but unsuccessful--so will have repeat MRI--Dr. Lorenzo Felty recommends another MRI--Brain in 6 months   • Anemia     Fe-Def---seeing GI---scheduled for EGD/Colonoscopy 5/14'   • Carotid aneurysm, right (HCC)     2mm R-carotid aneurysm-- - REFERRAL  6/10/14 - JUSTICE Charles    RYGB     Social History:   Retired/disabled   No smoking/ETOH/drugs    Family History:  Problem Relation Age of Onset   • Diabetes Father    • Diabetes Mother    No family history of intracranial/aortic aneurysms, connective HIVES  Penicillins             HIVES  Sulfa Antibiotics       HIVES    Physical Exam:  /68   Ht 65\"   Wt 237 lb (107.5 kg)   BMI 39.44 kg/m²   GENERAL:  The patient was pleasant and cooperative throughout the examination.     HEENT/NEURO:  The patien right internal carotid artery due to calcified   atherosclerotic disease. 3.  Minor calcification in the bilateral carotid bulbs without hemodynamically significant stenosis   within the neck arterial vasculature.    4.  Redemonstrated ill-defined 1.5 cm ISUIA data.  Large cavernous aneurysms may present with mass effect/cranial neuropathy, thromboembolic complications/stroke, sphenoid erosion with life threatening epistaxis, rupture into the cavernous sinus resulting in a carotid-cavernous sinus fistula, o progression/rupture. Additionally, we counseled the patient on lifestyle modification and though is a nonsmoker, the patient's BMI is quite elevated and could benefit from dietary modification with low calorie/low saturated fat diet and regular exercise.

## 2023-01-22 NOTE — ED INITIAL ASSESSMENT (HPI)
Pt states she had a couple of syncope at 0100 yesterday and 0600 today. Pt states she has pain on her right shoulder after she hit it on the door. Pt came from an immediate care for further evaluation. Got dizzy while at the immediate care this afternoon. show

## 2023-06-14 ENCOUNTER — HOSPITAL ENCOUNTER (INPATIENT)
Facility: HOSPITAL | Age: 68
LOS: 1 days | Discharge: HOME HEALTH | End: 2023-06-16
Attending: STUDENT IN AN ORGANIZED HEALTH CARE EDUCATION/TRAINING PROGRAM | Admitting: STUDENT IN AN ORGANIZED HEALTH CARE EDUCATION/TRAINING PROGRAM
Payer: MEDICARE

## 2023-06-14 ENCOUNTER — HOSPITAL ENCOUNTER (OUTPATIENT)
Facility: HOSPITAL | Age: 68
Setting detail: OBSERVATION
Discharge: HOME HEALTH | End: 2023-06-16
Attending: STUDENT IN AN ORGANIZED HEALTH CARE EDUCATION/TRAINING PROGRAM | Admitting: STUDENT IN AN ORGANIZED HEALTH CARE EDUCATION/TRAINING PROGRAM
Payer: MEDICARE

## 2023-06-14 DIAGNOSIS — R53.1 WEAKNESS GENERALIZED: Primary | ICD-10-CM

## 2023-06-14 DIAGNOSIS — R26.2 UNABLE TO AMBULATE: ICD-10-CM

## 2023-06-14 DIAGNOSIS — D64.9 ANEMIA, UNSPECIFIED TYPE: ICD-10-CM

## 2023-06-14 LAB
BASOPHILS # BLD AUTO: 0.03 X10(3) UL (ref 0–0.2)
BASOPHILS NFR BLD AUTO: 0.5 %
EOSINOPHIL # BLD AUTO: 0.11 X10(3) UL (ref 0–0.7)
EOSINOPHIL NFR BLD AUTO: 1.8 %
ERYTHROCYTE [DISTWIDTH] IN BLOOD BY AUTOMATED COUNT: 13.8 %
HCT VFR BLD AUTO: 35.2 %
HGB BLD-MCNC: 11.3 G/DL
IMM GRANULOCYTES # BLD AUTO: 0.01 X10(3) UL (ref 0–1)
IMM GRANULOCYTES NFR BLD: 0.2 %
LYMPHOCYTES # BLD AUTO: 1.55 X10(3) UL (ref 1–4)
LYMPHOCYTES NFR BLD AUTO: 25.2 %
MCH RBC QN AUTO: 26.3 PG (ref 26–34)
MCHC RBC AUTO-ENTMCNC: 32.1 G/DL (ref 31–37)
MCV RBC AUTO: 82.1 FL
MONOCYTES # BLD AUTO: 0.6 X10(3) UL (ref 0.1–1)
MONOCYTES NFR BLD AUTO: 9.7 %
NEUTROPHILS # BLD AUTO: 3.86 X10 (3) UL (ref 1.5–7.7)
NEUTROPHILS # BLD AUTO: 3.86 X10(3) UL (ref 1.5–7.7)
NEUTROPHILS NFR BLD AUTO: 62.6 %
PLATELET # BLD AUTO: 320 10(3)UL (ref 150–450)
RBC # BLD AUTO: 4.29 X10(6)UL
WBC # BLD AUTO: 6.2 X10(3) UL (ref 4–11)

## 2023-06-14 RX ORDER — GABAPENTIN 300 MG/1
300 CAPSULE ORAL 3 TIMES DAILY
COMMUNITY

## 2023-06-14 RX ORDER — ROSUVASTATIN CALCIUM 5 MG/1
5 TABLET, COATED ORAL NIGHTLY
COMMUNITY

## 2023-06-15 ENCOUNTER — APPOINTMENT (OUTPATIENT)
Dept: ULTRASOUND IMAGING | Facility: HOSPITAL | Age: 68
End: 2023-06-15
Attending: STUDENT IN AN ORGANIZED HEALTH CARE EDUCATION/TRAINING PROGRAM
Payer: MEDICARE

## 2023-06-15 ENCOUNTER — APPOINTMENT (OUTPATIENT)
Dept: MRI IMAGING | Facility: HOSPITAL | Age: 68
End: 2023-06-15
Attending: Other
Payer: MEDICARE

## 2023-06-15 ENCOUNTER — APPOINTMENT (OUTPATIENT)
Dept: GENERAL RADIOLOGY | Facility: HOSPITAL | Age: 68
End: 2023-06-15
Attending: STUDENT IN AN ORGANIZED HEALTH CARE EDUCATION/TRAINING PROGRAM
Payer: MEDICARE

## 2023-06-15 PROBLEM — D64.9 ANEMIA: Status: ACTIVE | Noted: 2023-06-15

## 2023-06-15 PROBLEM — D64.9 ANEMIA, UNSPECIFIED TYPE: Status: ACTIVE | Noted: 2023-06-15

## 2023-06-15 PROBLEM — R53.1 WEAKNESS GENERALIZED: Status: ACTIVE | Noted: 2023-06-15

## 2023-06-15 PROBLEM — R26.2 UNABLE TO AMBULATE: Status: ACTIVE | Noted: 2023-06-15

## 2023-06-15 LAB
ALBUMIN SERPL-MCNC: 2.9 G/DL (ref 3.4–5)
ALBUMIN/GLOB SERPL: 0.6 {RATIO} (ref 1–2)
ALP LIVER SERPL-CCNC: 124 U/L
ALT SERPL-CCNC: 12 U/L
ANION GAP SERPL CALC-SCNC: 6 MMOL/L (ref 0–18)
ANION GAP SERPL CALC-SCNC: 7 MMOL/L (ref 0–18)
AST SERPL-CCNC: 12 U/L (ref 15–37)
ATRIAL RATE: 76 BPM
BASOPHILS # BLD AUTO: 0.03 X10(3) UL (ref 0–0.2)
BASOPHILS NFR BLD AUTO: 0.5 %
BILIRUB SERPL-MCNC: 0.4 MG/DL (ref 0.1–2)
BILIRUB UR QL STRIP.AUTO: NEGATIVE
BUN BLD-MCNC: 13 MG/DL (ref 7–18)
BUN BLD-MCNC: 9 MG/DL (ref 7–18)
CALCIUM BLD-MCNC: 8.8 MG/DL (ref 8.5–10.1)
CALCIUM BLD-MCNC: 9.7 MG/DL (ref 8.5–10.1)
CHLORIDE SERPL-SCNC: 108 MMOL/L (ref 98–112)
CHLORIDE SERPL-SCNC: 112 MMOL/L (ref 98–112)
CO2 SERPL-SCNC: 23 MMOL/L (ref 21–32)
CO2 SERPL-SCNC: 24 MMOL/L (ref 21–32)
COLOR UR AUTO: YELLOW
CREAT BLD-MCNC: 0.58 MG/DL
CREAT BLD-MCNC: 0.78 MG/DL
EOSINOPHIL # BLD AUTO: 0.15 X10(3) UL (ref 0–0.7)
EOSINOPHIL NFR BLD AUTO: 2.7 %
ERYTHROCYTE [DISTWIDTH] IN BLOOD BY AUTOMATED COUNT: 13.6 %
GFR SERPLBLD BASED ON 1.73 SQ M-ARVRAT: 83 ML/MIN/1.73M2 (ref 60–?)
GFR SERPLBLD BASED ON 1.73 SQ M-ARVRAT: 99 ML/MIN/1.73M2 (ref 60–?)
GLOBULIN PLAS-MCNC: 5.2 G/DL (ref 2.8–4.4)
GLUCOSE BLD-MCNC: 181 MG/DL (ref 70–99)
GLUCOSE BLD-MCNC: 193 MG/DL (ref 70–99)
GLUCOSE BLD-MCNC: 206 MG/DL (ref 70–99)
GLUCOSE BLD-MCNC: 212 MG/DL (ref 70–99)
GLUCOSE BLD-MCNC: 221 MG/DL (ref 70–99)
GLUCOSE BLD-MCNC: 252 MG/DL (ref 70–99)
GLUCOSE BLD-MCNC: 275 MG/DL (ref 70–99)
GLUCOSE UR STRIP.AUTO-MCNC: 50 MG/DL
HCT VFR BLD AUTO: 32.2 %
HGB BLD-MCNC: 10.3 G/DL
IMM GRANULOCYTES # BLD AUTO: 0.01 X10(3) UL (ref 0–1)
IMM GRANULOCYTES NFR BLD: 0.2 %
LEUKOCYTE ESTERASE UR QL STRIP.AUTO: NEGATIVE
LYMPHOCYTES # BLD AUTO: 1.71 X10(3) UL (ref 1–4)
LYMPHOCYTES NFR BLD AUTO: 30.4 %
MCH RBC QN AUTO: 26.1 PG (ref 26–34)
MCHC RBC AUTO-ENTMCNC: 32 G/DL (ref 31–37)
MCV RBC AUTO: 81.7 FL
MONOCYTES # BLD AUTO: 0.62 X10(3) UL (ref 0.1–1)
MONOCYTES NFR BLD AUTO: 11 %
NEUTROPHILS # BLD AUTO: 3.11 X10 (3) UL (ref 1.5–7.7)
NEUTROPHILS # BLD AUTO: 3.11 X10(3) UL (ref 1.5–7.7)
NEUTROPHILS NFR BLD AUTO: 55.2 %
NITRITE UR QL STRIP.AUTO: POSITIVE
NT-PROBNP SERPL-MCNC: 229 PG/ML (ref ?–125)
OSMOLALITY SERPL CALC.SUM OF ELEC: 295 MOSM/KG (ref 275–295)
OSMOLALITY SERPL CALC.SUM OF ELEC: 296 MOSM/KG (ref 275–295)
P AXIS: 49 DEGREES
P-R INTERVAL: 126 MS
PH UR STRIP.AUTO: 5 [PH] (ref 5–8)
PLATELET # BLD AUTO: 283 10(3)UL (ref 150–450)
POTASSIUM SERPL-SCNC: 3 MMOL/L (ref 3.5–5.1)
POTASSIUM SERPL-SCNC: 3.3 MMOL/L (ref 3.5–5.1)
POTASSIUM SERPL-SCNC: 4.3 MMOL/L (ref 3.5–5.1)
PROT SERPL-MCNC: 8.1 G/DL (ref 6.4–8.2)
PROT UR STRIP.AUTO-MCNC: NEGATIVE MG/DL
Q-T INTERVAL: 400 MS
QRS DURATION: 128 MS
QTC CALCULATION (BEZET): 450 MS
R AXIS: 61 DEGREES
RBC # BLD AUTO: 3.94 X10(6)UL
RBC UR QL AUTO: NEGATIVE
SODIUM SERPL-SCNC: 139 MMOL/L (ref 136–145)
SODIUM SERPL-SCNC: 141 MMOL/L (ref 136–145)
SP GR UR STRIP.AUTO: 1.01 (ref 1–1.03)
T AXIS: -9 DEGREES
TROPONIN I HIGH SENSITIVITY: 6 NG/L
TSI SER-ACNC: 1.83 MIU/ML (ref 0.36–3.74)
UROBILINOGEN UR STRIP.AUTO-MCNC: <2 MG/DL
VENTRICULAR RATE: 76 BPM
WBC # BLD AUTO: 5.6 X10(3) UL (ref 4–11)

## 2023-06-15 PROCEDURE — 93971 EXTREMITY STUDY: CPT | Performed by: STUDENT IN AN ORGANIZED HEALTH CARE EDUCATION/TRAINING PROGRAM

## 2023-06-15 PROCEDURE — 71045 X-RAY EXAM CHEST 1 VIEW: CPT | Performed by: STUDENT IN AN ORGANIZED HEALTH CARE EDUCATION/TRAINING PROGRAM

## 2023-06-15 PROCEDURE — 99223 1ST HOSP IP/OBS HIGH 75: CPT | Performed by: OTHER

## 2023-06-15 PROCEDURE — 70551 MRI BRAIN STEM W/O DYE: CPT | Performed by: OTHER

## 2023-06-15 RX ORDER — ENOXAPARIN SODIUM 150 MG/ML
1 INJECTION SUBCUTANEOUS EVERY 12 HOURS SCHEDULED
Status: DISCONTINUED | OUTPATIENT
Start: 2023-06-15 | End: 2023-06-16

## 2023-06-15 RX ORDER — NICOTINE POLACRILEX 4 MG
15 LOZENGE BUCCAL
Status: DISCONTINUED | OUTPATIENT
Start: 2023-06-15 | End: 2023-06-16

## 2023-06-15 RX ORDER — SODIUM CHLORIDE 9 MG/ML
125 INJECTION, SOLUTION INTRAVENOUS CONTINUOUS
Status: DISCONTINUED | OUTPATIENT
Start: 2023-06-15 | End: 2023-06-15

## 2023-06-15 RX ORDER — FLUOXETINE HYDROCHLORIDE 20 MG/1
20 CAPSULE ORAL DAILY
Status: DISCONTINUED | OUTPATIENT
Start: 2023-06-15 | End: 2023-06-16

## 2023-06-15 RX ORDER — POTASSIUM CHLORIDE 20 MEQ/1
40 TABLET, EXTENDED RELEASE ORAL EVERY 4 HOURS
Status: COMPLETED | OUTPATIENT
Start: 2023-06-15 | End: 2023-06-15

## 2023-06-15 RX ORDER — ENOXAPARIN SODIUM 100 MG/ML
40 INJECTION SUBCUTANEOUS DAILY
Status: DISCONTINUED | OUTPATIENT
Start: 2023-06-15 | End: 2023-06-15

## 2023-06-15 RX ORDER — LEVETIRACETAM 250 MG/1
250 TABLET ORAL 3 TIMES DAILY
Status: DISCONTINUED | OUTPATIENT
Start: 2023-06-15 | End: 2023-06-16

## 2023-06-15 RX ORDER — GABAPENTIN 300 MG/1
300 CAPSULE ORAL 3 TIMES DAILY
Status: DISCONTINUED | OUTPATIENT
Start: 2023-06-15 | End: 2023-06-16

## 2023-06-15 RX ORDER — DEXTROSE MONOHYDRATE 25 G/50ML
50 INJECTION, SOLUTION INTRAVENOUS
Status: DISCONTINUED | OUTPATIENT
Start: 2023-06-15 | End: 2023-06-16

## 2023-06-15 RX ORDER — AMLODIPINE BESYLATE 5 MG/1
10 TABLET ORAL DAILY
Status: DISCONTINUED | OUTPATIENT
Start: 2023-06-15 | End: 2023-06-16

## 2023-06-15 RX ORDER — NICOTINE POLACRILEX 4 MG
30 LOZENGE BUCCAL
Status: DISCONTINUED | OUTPATIENT
Start: 2023-06-15 | End: 2023-06-16

## 2023-06-15 RX ORDER — ACETAMINOPHEN 500 MG
500 TABLET ORAL EVERY 4 HOURS PRN
Status: DISCONTINUED | OUTPATIENT
Start: 2023-06-15 | End: 2023-06-16

## 2023-06-15 RX ORDER — GABAPENTIN 300 MG/1
300 CAPSULE ORAL 3 TIMES DAILY
Status: DISCONTINUED | OUTPATIENT
Start: 2023-06-15 | End: 2023-06-15

## 2023-06-15 NOTE — PLAN OF CARE
Patient alert and oriented x4. Room air. Telemetry monitoring. Non-cardiac electrolyte protocol. Last bowel movement 6/15 w/ orders per protocol to r/o CDIFF. Isolation in place. US lower extremity: positive for DVT. Glasses and upper dentures at bedside. Regular diet with accuchecks. MRI results pending. PT/OT pending post tests completed. Seizure precautions in place. Last known seizure in 2019.

## 2023-06-15 NOTE — OCCUPATIONAL THERAPY NOTE
OT order received, chart reviewed. Brain imaging pending. Will await imaging results, and follow-up at a later date. Pt now with pending US to R/O DVT. OT will follow up pending results.

## 2023-06-15 NOTE — ED INITIAL ASSESSMENT (HPI)
Here for persistent generalized weakness with generalized ashes & pain. Claimed to be been sick for \"weeks\". Recent MRI done under [facility].  No asymmetrical weakness noted

## 2023-06-15 NOTE — PHYSICAL THERAPY NOTE
PT order received, chart reviewed. Waiting for further imaging results before initiating therapy. Will follow up at later date.

## 2023-06-15 NOTE — PLAN OF CARE
NURSING ADMISSION NOTE      Patient admitted via bed. Oriented to room. Safety precautions initiated. Bed in low position. Call light in reach. Patient A & O x4. VSS, on RA. C/o pain, controlled with gabapentin. Purewick in place, DTV by 0900. IVF infusing per order. SCDs. Safety measures in place. Instructed to use call light.

## 2023-06-16 VITALS
RESPIRATION RATE: 18 BRPM | HEART RATE: 76 BPM | SYSTOLIC BLOOD PRESSURE: 125 MMHG | BODY MASS INDEX: 36.95 KG/M2 | WEIGHT: 229.94 LBS | DIASTOLIC BLOOD PRESSURE: 55 MMHG | HEIGHT: 66 IN | OXYGEN SATURATION: 99 % | TEMPERATURE: 98 F

## 2023-06-16 LAB
GLUCOSE BLD-MCNC: 138 MG/DL (ref 70–99)
GLUCOSE BLD-MCNC: 203 MG/DL (ref 70–99)

## 2023-06-16 PROCEDURE — 99233 SBSQ HOSP IP/OBS HIGH 50: CPT | Performed by: OTHER

## 2023-06-16 RX ORDER — PANTOPRAZOLE SODIUM 40 MG/1
40 TABLET, DELAYED RELEASE ORAL DAILY
Qty: 30 TABLET | Refills: 0 | Status: SHIPPED | OUTPATIENT
Start: 2023-06-16 | End: 2023-07-16

## 2023-06-16 NOTE — DISCHARGE INSTRUCTIONS
You will need prescriptions for any medications beyond  30 days from your primary care doctor. Please follow up accordingly.

## 2023-06-16 NOTE — PLAN OF CARE
Patient A & O x4. VSS, on RA. C/o pain, controlled with gabapentin. Voiding freely. Up mod assist with walker and gait belt. Safety measures in place. Instructed to use call light.

## 2023-06-16 NOTE — PROGRESS NOTES
06/16/23 1045   Clinical Encounter Type   Visited With Patient   Routine Visit Introduction   Continue Visiting No   Family Spiritual Encounters   Family Coping Accepting   Family Participation in Care Consistently   Family Support During Treatment Consistently   Taxonomy   Intended Effects Build relationship of care and support   Methods Offer spiritual/Episcopalian support   Interventions Assist someone with Advance Directives     Created a new POA for Healthcare document that, per the patient, accurately reflects their wishes. Gave the patient the original POA document along with copies. A copy of the new POA document has been placed on the patient's paper chart. Spiritual care can be requested via an [employer] consult.

## 2023-06-16 NOTE — PLAN OF CARE
Patient alert and oriented x4. Room air. Telemetry monitoring. Non-cardiac electrolyte protocol. Isolation discontinued. Glasses and upper dentures at bedside. Regular diet with accuchecks. MRI results reviewed and signed off. PT/OT evaluations completed. Seizure precautions in place. Last known seizure in 2019. Meds to beds for patients discharge medications. Patient will be discharged home with family.

## 2023-06-16 NOTE — CM/SW NOTE
06/16/23 1600   CM/SW Referral Data   Referral Source Social Work (self-referral)   Reason for Referral Discharge planning   Informant EMR;Clinical Staff Member   Discharge Needs   Anticipated D/C needs Home health care     HOME SITUATION  Type of Home: Apartment   Home Layout: Two level; Able to live on main level  Stairs to Enter : 4  Stairs to Bedroom: 14     Lives With: Son  Patient Owned Equipment: Rollator  Patient Regularly Uses: Reading glasses     Prior Level of [place] per PT eval: Since January pt has experienced increased weakness. Her son lives with her and is not working, can assist her throughout the day. She has been receiving help for sit<>stand but can perform bed mobility on her own with inc time. Patient is a 81 y/o woman admitted for weakness. PT recommending Ori Seugra at NE. Referrals sent via Nikolai Cole, however no agencies able to accept and provide service to pt in her home area [place], [place]). Updated RN and recommended pt follow up with PCP. Also received order for eliquis pricing. Script sent to [facility] pharmacy by RN and meds to beds will bring to pt's room prior to NE. No further needs at this time. / to remain available for support and/or discharge planning.      Esvin Palmer [facility]  Discharge Planner  [phone]

## 2023-06-19 ENCOUNTER — PATIENT OUTREACH (OUTPATIENT)
Dept: CASE MANAGEMENT | Age: 68
End: 2023-06-19

## 2023-06-19 NOTE — PROGRESS NOTES
First attempt. (dc 9/90)    Nelson Coates MD  Specialty: Internal Medicine  [address]  [address] [address]  1 week    Merlene Solomon MD  Specialty: Hematology and Oncology  call to establish with hematology for the clot in your leg. [address]  [address] [address]  [phone]  2 weeks    No answer, left a voicemail with callback information.
